# Patient Record
Sex: MALE | Race: ASIAN | URBAN - METROPOLITAN AREA
[De-identification: names, ages, dates, MRNs, and addresses within clinical notes are randomized per-mention and may not be internally consistent; named-entity substitution may affect disease eponyms.]

---

## 2021-12-23 ENCOUNTER — OFFICE VISIT (OUTPATIENT)
Dept: URBAN - METROPOLITAN AREA CLINIC 50 | Facility: CLINIC | Age: 61
End: 2021-12-23
Payer: OTHER GOVERNMENT

## 2021-12-23 DIAGNOSIS — H11.053 PERIPHERAL PTERYGIUM, STATIONARY, BILATERAL: ICD-10-CM

## 2021-12-23 DIAGNOSIS — H25.13 AGE-RELATED NUCLEAR CATARACT, BILATERAL: ICD-10-CM

## 2021-12-23 DIAGNOSIS — H40.1131 PRIMARY OPEN-ANGLE GLAUCOMA, MILD STAGE, BILATERAL: Primary | ICD-10-CM

## 2021-12-23 DIAGNOSIS — H16.223 KERATOCONJUNCTIVITIS SICCA, BILATERAL: ICD-10-CM

## 2021-12-23 PROCEDURE — 92020 GONIOSCOPY: CPT | Performed by: OPTOMETRIST

## 2021-12-23 PROCEDURE — 99213 OFFICE O/P EST LOW 20 MIN: CPT | Performed by: OPTOMETRIST

## 2021-12-23 ASSESSMENT — INTRAOCULAR PRESSURE
OD: 18
OS: 19
OS: 18

## 2021-12-23 NOTE — IMPRESSION/PLAN
Impression: Peripheral pterygium, stationary, bilateral: H11.053. Plan: OU: Discussed diagnosis in detail with patient. No treatment is required at this time. Will continue to observe condition and or symptoms. Call if [address] worsens. Patient instructed to use artificial tears as needed.

## 2021-12-23 NOTE — IMPRESSION/PLAN
Impression: Keratoconjunctivitis sicca, bilateral: G92.831. Plan: Dry eyes account for the patient's complaints. There is no evidence of permanent changes to the cornea. Explained condition does not have a cure and will need artificial tears for maintenance.

## 2021-12-23 NOTE — IMPRESSION/PLAN
Impression: Primary open-angle glaucoma, mild stage, bilateral: H40.1131. Plan: Condition and IOP are stable today. Current therapy is working well, no changes being made to current treatment at this time. Continue Alphagan OU BID. Emphasized and explained compliance. Reassured patient of current condition and treatment and discussed risks of glaucoma progression. Will continue to monitor IOP.

## 2022-03-24 ENCOUNTER — OFFICE VISIT (OUTPATIENT)
Dept: URBAN - METROPOLITAN AREA CLINIC 50 | Facility: CLINIC | Age: 62
End: 2022-03-24
Payer: OTHER GOVERNMENT

## 2022-03-24 PROCEDURE — 99214 OFFICE O/P EST MOD 30 MIN: CPT | Performed by: OPTOMETRIST

## 2022-03-24 RX ORDER — BRIMONIDINE TARTRATE 1 MG/ML
0.1 % SOLUTION/ DROPS OPHTHALMIC
Qty: 10 | Refills: 11 | Status: ACTIVE
Start: 2022-03-24

## 2022-03-24 ASSESSMENT — INTRAOCULAR PRESSURE
OD: 22
OD: 20
OS: 19
OS: 25

## 2022-05-06 NOTE — IMPRESSION/PLAN
Impression: Primary open-angle glaucoma, mild stage, bilateral: H40.1131. Plan: Condition and IOP are stable today. Current therapy is working well, no changes being made to current treatment at this time. Continue Alphagan OU TID. Emphasized and explained compliance. Reassured patient of current condition and treatment and discussed risks of glaucoma progression. Will continue to monitor IOP.

## 2022-07-22 NOTE — IMPRESSION/PLAN
Impression: Unspecified epiphora, bilateral: H04.203. Plan: see plan #1 - begin RX Betamethasone ointment BLL BID, patient is allergic to Alphagan causing side effects & is to discontinue medicated drop.

## 2022-07-22 NOTE — IMPRESSION/PLAN
Impression: Keratoconjunctivitis sicca, bilateral: W33.037. Plan: Dry eyes account for the patient's complaints. There is no evidence of permanent changes to the cornea. Explained condition does not have a cure and will need artificial tears for maintenance. Patient instructed to use artificial tears 4-6x/daily. Explained it may take time for eyes to acclimate completely to OTC regiment.

## 2022-07-22 NOTE — IMPRESSION/PLAN
Impression: Primary open-angle glaucoma, mild stage, bilateral: H40.1131. Plan: Intraocular pressure well controlled, side effects from medication Alphagan account for the patient's complaints. Will discontinue. Follow up in 2 weeks for IOP check from not using medication.

## 2022-07-22 NOTE — IMPRESSION/PLAN
Impression: Peripheral pterygium, stationary, bilateral: H11.053. Plan: Discussed diagnosis in detail with patient. No treatment is required at this time. Will continue to observe condition and or symptoms. Call if [address] worsens. Patient instructed to use artificial tears as needed.

## 2022-08-04 NOTE — IMPRESSION/PLAN
Impression: Unspecified epiphora, bilateral: H04.203. Plan: Discussed diagnosis in detail with patient. Discussed treatment options with patient. New medication RX Doxy 100mg BID given today and recommended to use for 10 days. Continue Betamethasone ointment both lower eyelids BID. Will continue to observe condition and or symptoms.

## 2022-08-04 NOTE — IMPRESSION/PLAN
Impression: Keratoconjunctivitis sicca, bilateral: U27.736. Plan: Dry eyes account for the patient's complaints. There is no evidence of permanent changes to the cornea. Explained condition does not have a cure and will need artificial tears for maintenance. Patient instructed to use artificial tears 4-6x/daily. Explained it may take time for eyes to acclimate completely to OTC regiment.

## 2022-08-04 NOTE — IMPRESSION/PLAN
Impression: Primary open-angle glaucoma, mild stage, bilateral: H40.1471. Plan: Intraocular pressure elevated. Consider adding medication Rocklatan OU QHS to help lower intraocular pressure, will re-elevated at next visit.  Samples given at today's visit.

(patient allergic to Alphagan)

## 2022-09-01 NOTE — IMPRESSION/PLAN
Impression: Peripheral pterygium, stationary, bilateral: H11.053. Plan: Recommended to protect eyes from sun, dust wind (ultraviolet-blocking sunglasses or goggles if appropriate.  Artificial gel drops OU QID or more if irritation persist.

## 2022-09-01 NOTE — IMPRESSION/PLAN
Impression: Primary open-angle glaucoma, mild stage, bilateral: H40.1131. Plan: Patient allergic to Lumigan. Patient has shown irritation to Latanoprost, Dorzolamide, Alphagan/Brimonidine, Timolol and Rhopressa. Follow up with Dr. Christiana Barahona for glaucoma eval and possible glaucoma surgery.

## 2022-09-01 NOTE — IMPRESSION/PLAN
Impression: Keratoconjunctivitis sicca, bilateral: L97.436. Plan: Dry eyes account for the patient's complaints. There is no evidence of permanent changes to the cornea. Explained condition does not have a cure and will need artificial tears for maintenance, recommended to use 4-6 times a day.

## 2022-10-14 NOTE — IMPRESSION/PLAN
Impression: Primary open-angle glaucoma, mild stage, bilateral: H40.1131. Plan: Discussed and reviewed diagnosis with patient. Discussed treating with drops vs SLT. Patient decide to start PF drops at this time. Patient has had  side effects and allergy to previous eye drops. Will start PF Dorzolamide/Timolol. Large disc well preserved VF and RNFL.  


Start PF Dorz/Desmond BID OU


RTC 4 weeks IOP heck

## 2023-03-31 NOTE — IMPRESSION/PLAN
Impression: Primary open-angle glaucoma, mild stage, bilateral: H40.1131. Plan: Discussed and reviewed diagnosis with patient. IOP elevated, per pt she has not been using combination gtts due to irritation.  Will switch to Dorzolamide-timolol bid and latanoprost qhs ou

Start: Dorzolamide-timolol bid ou and latanoporst qhs ou

## 2023-06-23 NOTE — IMPRESSION/PLAN
Impression: Primary open-angle glaucoma, mild stage, bilateral: H40.1131. Plan:  Discussed and reviewed diagnosis with patient today, understood by patient, discussed reviewed VF and OCT with patient today, borderline thinning, intraocular pressure stable with medication. Continue medications and observe. Importance of compliance with medications and regular follow-up reiterated, will continue to monitor.  


Start: Dorzolamide-timolol bid ou and latanoprost qhs ou
start: AFT's PRN or before applying glaucoma drops